# Patient Record
Sex: FEMALE | Race: WHITE | URBAN - METROPOLITAN AREA
[De-identification: names, ages, dates, MRNs, and addresses within clinical notes are randomized per-mention and may not be internally consistent; named-entity substitution may affect disease eponyms.]

---

## 2020-12-10 ENCOUNTER — OFFICE VISIT (OUTPATIENT)
Dept: URBAN - METROPOLITAN AREA CLINIC 98 | Facility: CLINIC | Age: 65
End: 2020-12-10
Payer: MEDICARE

## 2020-12-10 VITALS
HEIGHT: 68 IN | WEIGHT: 141.4 LBS | TEMPERATURE: 96.9 F | BODY MASS INDEX: 21.43 KG/M2 | SYSTOLIC BLOOD PRESSURE: 119 MMHG | HEART RATE: 65 BPM | DIASTOLIC BLOOD PRESSURE: 78 MMHG

## 2020-12-10 DIAGNOSIS — K76.89 LIVER CYST: ICD-10-CM

## 2020-12-10 PROCEDURE — 99214 OFFICE O/P EST MOD 30 MIN: CPT | Performed by: INTERNAL MEDICINE

## 2020-12-10 PROCEDURE — G9903 PT SCRN TBCO ID AS NON USER: HCPCS | Performed by: INTERNAL MEDICINE

## 2020-12-10 PROCEDURE — 3017F COLORECTAL CA SCREEN DOC REV: CPT | Performed by: INTERNAL MEDICINE

## 2020-12-10 PROCEDURE — G8420 CALC BMI NORM PARAMETERS: HCPCS | Performed by: INTERNAL MEDICINE

## 2020-12-10 PROCEDURE — G8482 FLU IMMUNIZE ORDER/ADMIN: HCPCS | Performed by: INTERNAL MEDICINE

## 2020-12-10 PROCEDURE — G8427 DOCREV CUR MEDS BY ELIG CLIN: HCPCS | Performed by: INTERNAL MEDICINE

## 2020-12-10 RX ORDER — IBUPROFEN 200 MG/1
TABLET, COATED ORAL
Qty: 0 | Refills: 0 | Status: ACTIVE | COMMUNITY
Start: 1900-01-01

## 2020-12-10 RX ORDER — CLONAZEPAM 0.5 MG/1
1 TABLET AT BEDTIME TABLET ORAL ONCE A DAY
Status: ACTIVE | COMMUNITY

## 2020-12-10 RX ORDER — CETIRIZINE HYDROCHLORIDE 10 MG/1
TABLET, FILM COATED ORAL
Qty: 0 | Refills: 0 | Status: ACTIVE | COMMUNITY
Start: 1900-01-01

## 2020-12-10 NOTE — HPI-TODAY'S VISIT:
Here on referral from Dr Jacob Choudhury for liver abnormal scan seen incidently on MR breast.  dad alcoholic -  of brain cancer.  pt has no prior knowledge of liver test abnormalities - on her MR: there were scattered cysts in liver. r lobe 2.3 cm, left lobe 1.1cm. no abdominal pain.  no itching.  no nausea / early satiety

## 2020-12-10 NOTE — PHYSICAL EXAM NEUROLOGIC:
oriented to person, place and time ; short and long term memory intact [employer] Preamble Text (Leave Blank If You Do Not Want): Undermining was performed with blunt dissection.

## 2020-12-11 LAB
A/G RATIO: 1.6
ALBUMIN: 4.4
ALKALINE PHOSPHATASE: 66
ALT (SGPT): 18
AST (SGOT): 21
BASO (ABSOLUTE): 0.1
BASOS: 1
BILIRUBIN, TOTAL: 0.3
BUN/CREATININE RATIO: 27
BUN: 20
CALCIUM: 10.2
CARBON DIOXIDE, TOTAL: 23
CHLORIDE: 104
COMMENT:: (no result)
CREATININE: 0.75
EGFR IF AFRICN AM: 96
EGFR IF NONAFRICN AM: 83
EOS (ABSOLUTE): 0.1
EOS: 2
GLOBULIN, TOTAL: 2.7
GLUCOSE: 88
HBSAG SCREEN: NEGATIVE
HCV AB: <0.1
HEMATOCRIT: 39.2
HEMATOLOGY COMMENTS:: (no result)
HEMOGLOBIN: 13.1
HEP A AB, TOTAL: NEGATIVE
HEP B CORE AB, TOT: NEGATIVE
HEPATITIS B SURF AB QUANT: <3.1
IMMATURE CELLS: (no result)
IMMATURE GRANS (ABS): 0
IMMATURE GRANULOCYTES: 0
LYMPHS (ABSOLUTE): 1.2
LYMPHS: 22
MCH: 28.2
MCHC: 33.4
MCV: 85
MONOCYTES(ABSOLUTE): 0.4
MONOCYTES: 8
NEUTROPHILS (ABSOLUTE): 3.6
NEUTROPHILS: 67
NRBC: (no result)
PLATELETS: 284
POTASSIUM: 4.4
PROTEIN, TOTAL: 7.1
RBC: 4.64
RDW: 12
SODIUM: 137
WBC: 5.3

## 2021-09-15 ENCOUNTER — TELEPHONE ENCOUNTER (OUTPATIENT)
Dept: URBAN - METROPOLITAN AREA CLINIC 98 | Facility: CLINIC | Age: 66
End: 2021-09-15

## 2021-10-12 ENCOUNTER — LAB OUTSIDE AN ENCOUNTER (OUTPATIENT)
Dept: URBAN - METROPOLITAN AREA CLINIC 98 | Facility: CLINIC | Age: 66
End: 2021-10-12

## 2021-10-14 ENCOUNTER — TELEPHONE ENCOUNTER (OUTPATIENT)
Dept: URBAN - METROPOLITAN AREA CLINIC 98 | Facility: CLINIC | Age: 66
End: 2021-10-14

## 2021-10-15 LAB
A/G RATIO: 2.1
ALBUMIN: 4.8
ALKALINE PHOSPHATASE: 70
ALT (SGPT): 17
AST (SGOT): 19
BASO (ABSOLUTE): 0.1
BASOS: 1
BILIRUBIN, TOTAL: 0.3
BUN/CREATININE RATIO: 27
BUN: 18
CALCIUM: 9.4
CARBON DIOXIDE, TOTAL: 24
CHLORIDE: 102
CREATININE: 0.66
EGFR IF AFRICN AM: 106
EGFR IF NONAFRICN AM: 92
EOS (ABSOLUTE): 0.1
EOS: 1
GGT: 13
GLOBULIN, TOTAL: 2.3
GLUCOSE: 91
HEMATOCRIT: 40.2
HEMATOLOGY COMMENTS:: (no result)
HEMOGLOBIN: 13.2
IMMATURE CELLS: (no result)
IMMATURE GRANS (ABS): 0
IMMATURE GRANULOCYTES: 0
LYMPHS (ABSOLUTE): 1.4
LYMPHS: 20
MCH: 27.7
MCHC: 32.8
MCV: 84
MONOCYTES(ABSOLUTE): 0.6
MONOCYTES: 8
NEUTROPHILS (ABSOLUTE): 5
NEUTROPHILS: 70
NRBC: (no result)
PLATELETS: 337
POTASSIUM: 4
PROTEIN, TOTAL: 7.1
RBC: 4.77
RDW: 11.5
SODIUM: 140
WBC: 7.2

## 2021-10-18 ENCOUNTER — OFFICE VISIT (OUTPATIENT)
Dept: URBAN - METROPOLITAN AREA CLINIC 98 | Facility: CLINIC | Age: 66
End: 2021-10-18
Payer: MEDICARE

## 2021-10-18 ENCOUNTER — WEB ENCOUNTER (OUTPATIENT)
Dept: URBAN - METROPOLITAN AREA CLINIC 98 | Facility: CLINIC | Age: 66
End: 2021-10-18

## 2021-10-18 VITALS
BODY MASS INDEX: 21.16 KG/M2 | HEART RATE: 64 BPM | TEMPERATURE: 97.3 F | SYSTOLIC BLOOD PRESSURE: 109 MMHG | DIASTOLIC BLOOD PRESSURE: 69 MMHG | WEIGHT: 139.6 LBS | HEIGHT: 68 IN

## 2021-10-18 DIAGNOSIS — K83.8 DILATED BILE DUCT: ICD-10-CM

## 2021-10-18 DIAGNOSIS — K76.89 LIVER CYST: ICD-10-CM

## 2021-10-18 PROCEDURE — 99214 OFFICE O/P EST MOD 30 MIN: CPT | Performed by: INTERNAL MEDICINE

## 2021-10-18 RX ORDER — CETIRIZINE HYDROCHLORIDE 10 MG/1
TABLET, FILM COATED ORAL
Qty: 0 | Refills: 0 | Status: ACTIVE | COMMUNITY
Start: 1900-01-01

## 2021-10-18 RX ORDER — CLONAZEPAM 0.5 MG/1
1 TABLET AT BEDTIME TABLET ORAL ONCE A DAY
Status: ACTIVE | COMMUNITY

## 2021-10-18 RX ORDER — IBUPROFEN 200 MG/1
TABLET, COATED ORAL
Qty: 0 | Refills: 0 | Status: ACTIVE | COMMUNITY
Start: 1900-01-01

## 2021-10-18 NOTE — HPI-TODAY'S VISIT:
HEre to follow up after RUQ US . c/o stomach bloating. having some chronic back issues.   mild heartburn - but she attributes to personal stress.  flares up when she is emotional.  had stopped her probiotic.   working on implants.  . seen last year for incidental finding of hepatic cyst.  she did not get AJAY US as ordered last year but then MRI of back to follow up for neck fusion showed dilated CBD  US 10/2021 showed :  Enlargement of R hepatic lobe cyst w mural nodularity .  prev 1.8cm 2014, now 2.7cm  CBD dilation 1cm w/o obvious obstructing lesion . stress going through a divorce with  who suffers from alcoholism

## 2021-11-01 ENCOUNTER — LAB OUTSIDE AN ENCOUNTER (OUTPATIENT)
Dept: URBAN - METROPOLITAN AREA CLINIC 98 | Facility: CLINIC | Age: 66
End: 2021-11-01

## 2021-11-04 ENCOUNTER — TELEPHONE ENCOUNTER (OUTPATIENT)
Dept: URBAN - METROPOLITAN AREA CLINIC 98 | Facility: CLINIC | Age: 66
End: 2021-11-04

## 2021-11-05 ENCOUNTER — WEB ENCOUNTER (OUTPATIENT)
Dept: URBAN - METROPOLITAN AREA CLINIC 86 | Facility: CLINIC | Age: 66
End: 2021-11-05

## 2023-01-27 ENCOUNTER — LAB OUTSIDE AN ENCOUNTER (OUTPATIENT)
Dept: URBAN - METROPOLITAN AREA CLINIC 98 | Facility: CLINIC | Age: 68
End: 2023-01-27

## 2023-01-27 ENCOUNTER — TELEPHONE ENCOUNTER (OUTPATIENT)
Dept: URBAN - METROPOLITAN AREA CLINIC 98 | Facility: CLINIC | Age: 68
End: 2023-01-27

## 2023-01-27 ENCOUNTER — WEB ENCOUNTER (OUTPATIENT)
Dept: URBAN - METROPOLITAN AREA CLINIC 86 | Facility: CLINIC | Age: 68
End: 2023-01-27

## 2023-02-01 ENCOUNTER — LAB OUTSIDE AN ENCOUNTER (OUTPATIENT)
Dept: URBAN - METROPOLITAN AREA CLINIC 96 | Facility: CLINIC | Age: 68
End: 2023-02-01

## 2023-02-01 ENCOUNTER — OFFICE VISIT (OUTPATIENT)
Dept: URBAN - METROPOLITAN AREA CLINIC 96 | Facility: CLINIC | Age: 68
End: 2023-02-01
Payer: MEDICARE

## 2023-02-01 VITALS
DIASTOLIC BLOOD PRESSURE: 81 MMHG | BODY MASS INDEX: 20.16 KG/M2 | WEIGHT: 133 LBS | HEART RATE: 69 BPM | HEIGHT: 68 IN | SYSTOLIC BLOOD PRESSURE: 151 MMHG | TEMPERATURE: 98 F

## 2023-02-01 DIAGNOSIS — R19.7 DIARRHEA, UNSPECIFIED TYPE: ICD-10-CM

## 2023-02-01 DIAGNOSIS — R10.13 EPIGASTRIC PAIN: ICD-10-CM

## 2023-02-01 DIAGNOSIS — R11.0 NAUSEA: ICD-10-CM

## 2023-02-01 DIAGNOSIS — K58.0 IRRITABLE BOWEL SYNDROME WITH DIARRHEA: ICD-10-CM

## 2023-02-01 DIAGNOSIS — R14.0 ABDOMINAL BLOATING: ICD-10-CM

## 2023-02-01 PROBLEM — 197125005: Status: ACTIVE | Noted: 2023-02-01

## 2023-02-01 PROCEDURE — 99204 OFFICE O/P NEW MOD 45 MIN: CPT | Performed by: INTERNAL MEDICINE

## 2023-02-01 RX ORDER — IBUPROFEN 200 MG/1
TABLET, COATED ORAL
Qty: 0 | Refills: 0 | COMMUNITY
Start: 1900-01-01

## 2023-02-01 RX ORDER — CLONAZEPAM 0.5 MG/1
1 TABLET AT BEDTIME TABLET ORAL ONCE A DAY
COMMUNITY

## 2023-02-01 RX ORDER — CETIRIZINE HYDROCHLORIDE 10 MG/1
TABLET, FILM COATED ORAL
Qty: 0 | Refills: 0 | COMMUNITY
Start: 1900-01-01

## 2023-02-01 NOTE — HPI-TODAY'S VISIT:
This is a 67-year-old female referred for GI consultation and a copy will be sent to the referring provider Dr. ANDRE Choudhury.  Patient has been seeing my partner Dr. Valdez for some liver issues and has had studies including an MRI for this in 10/2021.  I met her back in 2018 at which time she saw me for IBS symptoms.  She had diarrhea predominant IBS for 2 decades at the time.  Unfortunately her sister had  of stomach cancer at the time so she had more stress in her life which was understandable.  She also complained of some burning and nausea at that time.  I ordered a lactulose breath test to rule out lactose intolerance and an upper endoscopy and Xifaxan for her IBS.  EGD was done in 2018.  This revealed no gross abnormalities.  Biopsies were sent.  Pathology revealed no evidence of celiac, reactive gastropathy of the antrum but no H. pylori and gastric body biopsies were normal. Pt says over the last year she feels more bloated and has some lower abd discomfort. Pt goes everyday but not having attacks of her IBS. Pt also has lots of growling and its very load. Pt does have some epigastric burning and some nausea after eating. Pt was nervous as her siter  of stomach cancer at this age.

## 2023-02-02 PROBLEM — 422587007 NAUSEA: Status: ACTIVE | Noted: 2023-02-02

## 2023-02-02 PROBLEM — 79922009 EPIGASTRIC PAIN: Status: ACTIVE | Noted: 2023-02-02

## 2023-02-06 ENCOUNTER — LAB OUTSIDE AN ENCOUNTER (OUTPATIENT)
Dept: URBAN - METROPOLITAN AREA CLINIC 96 | Facility: CLINIC | Age: 68
End: 2023-02-06

## 2023-02-09 ENCOUNTER — TELEPHONE ENCOUNTER (OUTPATIENT)
Dept: URBAN - METROPOLITAN AREA CLINIC 3 | Facility: CLINIC | Age: 68
End: 2023-02-09

## 2023-02-11 LAB
ADENOVIRUS F 40/41: NOT DETECTED
CAMPYLOBACTER: NOT DETECTED
CLOSTRIDIUM DIFFICILE: NOT DETECTED
CRYPTOSPORIDIUM: NOT DETECTED
CYCLOSPORA CAYETANESIS: NOT DETECTED
ENTAMOEBA HISTOLYTICA: NOT DETECTED
ENTAMOEBA HISTOLYTICA: NOT DETECTED
ENTEROAGGREGATIVE E.COLI: NOT DETECTED
ENTEROTOXIGENIC E.COLI: NOT DETECTED
ESCHERICHIA COLI O157: NOT DETECTED
GIARDIA LAMBIA: NOT DETECTED
NOROVIRUS GI/GII: NOT DETECTED
NOROVIRUS GI/GII: NOT DETECTED
ROTAVIRUS A: NOT DETECTED
SHIGA-LIKE TOXIN PRODUCING E.COLI: NOT DETECTED
SHIGELLA SPP. / ENTEROINVASIVE E.COLI: NOT DETECTED
VIBRIO CHOLERAE: NOT DETECTED
VIBRIO PARAHAEMOLYTICUS: NOT DETECTED
VIBRIO SPP.: NOT DETECTED
YERSINIA ENTEROCOLITICA: NOT DETECTED
YERSINIA ENTEROCOLITICA: NOT DETECTED

## 2023-02-22 ENCOUNTER — LAB OUTSIDE AN ENCOUNTER (OUTPATIENT)
Dept: URBAN - METROPOLITAN AREA CLINIC 98 | Facility: CLINIC | Age: 68
End: 2023-02-22

## 2023-02-22 LAB
A/G RATIO: 1.9
ALBUMIN: 4.5
ALKALINE PHOSPHATASE: 56
ALT (SGPT): 21
AST (SGOT): 18
BASO (ABSOLUTE): 0.1
BASOS: 1
BILIRUBIN, DIRECT: <0.1
BILIRUBIN, TOTAL: 0.3
BUN/CREATININE RATIO: 26
BUN: 17
CALCIUM: 9.3
CARBON DIOXIDE, TOTAL: 26
CHLORIDE: 105
CREATININE: 0.66
EGFR: 95
EOS (ABSOLUTE): 0.1
EOS: 2
GGT: 11
GLOBULIN, TOTAL: 2.4
GLUCOSE: 99
HEMATOCRIT: 40.6
HEMATOLOGY COMMENTS:: (no result)
HEMOGLOBIN: 13.1
IMMATURE CELLS: (no result)
IMMATURE GRANS (ABS): 0
IMMATURE GRANULOCYTES: 0
LYMPHS (ABSOLUTE): 1.4
LYMPHS: 34
MCH: 27.9
MCHC: 32.3
MCV: 86
MONOCYTES(ABSOLUTE): 0.4
MONOCYTES: 8
NEUTROPHILS (ABSOLUTE): 2.2
NEUTROPHILS: 55
NRBC: (no result)
PLATELETS: 270
POTASSIUM: 4.2
PROTEIN, TOTAL: 6.9
RBC: 4.7
RDW: 11.3
SODIUM: 144
WBC: 4.2

## 2023-02-24 ENCOUNTER — OFFICE VISIT (OUTPATIENT)
Dept: URBAN - METROPOLITAN AREA CLINIC 98 | Facility: CLINIC | Age: 68
End: 2023-02-24
Payer: MEDICARE

## 2023-02-24 VITALS
TEMPERATURE: 97.6 F | HEART RATE: 66 BPM | DIASTOLIC BLOOD PRESSURE: 65 MMHG | SYSTOLIC BLOOD PRESSURE: 135 MMHG | BODY MASS INDEX: 21.82 KG/M2 | WEIGHT: 139 LBS | HEIGHT: 67 IN

## 2023-02-24 DIAGNOSIS — K76.89 LIVER CYST: ICD-10-CM

## 2023-02-24 DIAGNOSIS — K83.8 DILATED BILE DUCT: ICD-10-CM

## 2023-02-24 DIAGNOSIS — N28.1 RENAL CYST: ICD-10-CM

## 2023-02-24 DIAGNOSIS — K86.2 PANCREATIC CYST: ICD-10-CM

## 2023-02-24 PROCEDURE — 99214 OFFICE O/P EST MOD 30 MIN: CPT | Performed by: INTERNAL MEDICINE

## 2023-02-24 RX ORDER — IBUPROFEN 200 MG/1
TABLET, COATED ORAL
Qty: 0 | Refills: 0 | Status: ACTIVE | COMMUNITY
Start: 1900-01-01

## 2023-02-24 RX ORDER — CLONAZEPAM 0.5 MG/1
1 TABLET AT BEDTIME TABLET ORAL ONCE A DAY
Status: ACTIVE | COMMUNITY

## 2023-02-24 RX ORDER — CETIRIZINE HYDROCHLORIDE 10 MG/1
TABLET, FILM COATED ORAL
Qty: 0 | Refills: 0 | Status: ACTIVE | COMMUNITY
Start: 1900-01-01

## 2023-02-24 NOTE — HPI-TODAY'S VISIT:
Here to follow for liver cysts.  has had some more bloating :planned to have EGD with Dr Dumont soon.   No difficulty eating

## 2023-02-24 NOTE — HPI-OTHER HISTORIES
REPORT MRI ABDOMEN WITHOUT AND WITH CONTRAST  CLINICAL HISTORY: Hepatic cysts and pancreatic cyst.  TECHNIQUE: MRI of the abdomen with attention to the liver and pancreas was performed using a variety of multiplanar pulse sequences before and after administration of 6 mL of Gadavist gadolinium contrast.  COMPARISON: 11/01/2021  FINDINGS: Scattered simple and mildly complex hepatic cysts are again present, largest of which is best seen on series 23 image 23, measuring 2.5 x 1.9 cm, unchanged. This largest lesion contains small septations. Scattered smaller cysts are scattered throughout the liver are also grossly unchanged. Common bile duct unchanged in caliber but again prominent, at 9 mm, with no definite filling defects. Gallbladder unremarkable. No significant intrahepatic biliary ductal dilation. Main pancreatic duct is normal in caliber at 1 - 2 mm. There are two tiny cystic lesions within the pancreas, unchanged from prior and best seen on series 3 images 8 and 9. The larger of these measures 3 mm in diameter and in retrospect measured at a similar level on the prior is unchanged.  There are a few scattered tiny renal cysts, largest of which arises from the left lower pole measuring 2.2 cm in diameter. These are unchanged. No upper abdominal lymphadenopathy. No ascites or significant pleural fluid.  IMPRESSION: 1.  Unchanged size of simple and mildly complex hepatic cyst.  2.  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 An additional surveillance MRI in two years is recommended.  3.  Stable prominence in caliber of the common bile duct without filling defects.  4.  Unchanged left renal cysts.  LOCATION:     Signature Line ***** Final *****  Dictated by:    Carmela Sawyer MD Dictated DT/TM: 02/23/2023 9:18 am Signed by:  Carmela Sawyer MD Signed (Electronic Signature):  02/23/2023 4:23 pm

## 2023-02-25 PROBLEM — 123608004: Status: ACTIVE | Noted: 2021-10-18

## 2023-02-25 PROBLEM — 85057007 LIVER CYST: Status: ACTIVE | Noted: 2020-12-10

## 2023-02-26 ENCOUNTER — WEB ENCOUNTER (OUTPATIENT)
Dept: URBAN - METROPOLITAN AREA CLINIC 96 | Facility: CLINIC | Age: 68
End: 2023-02-26

## 2023-03-07 ENCOUNTER — WEB ENCOUNTER (OUTPATIENT)
Dept: URBAN - METROPOLITAN AREA SURGERY CENTER 18 | Facility: SURGERY CENTER | Age: 68
End: 2023-03-07

## 2023-03-10 ENCOUNTER — OFFICE VISIT (OUTPATIENT)
Dept: URBAN - METROPOLITAN AREA SURGERY CENTER 18 | Facility: SURGERY CENTER | Age: 68
End: 2023-03-10

## 2023-06-09 ENCOUNTER — TELEPHONE ENCOUNTER (OUTPATIENT)
Dept: URBAN - METROPOLITAN AREA CLINIC 3 | Facility: CLINIC | Age: 68
End: 2023-06-09

## 2023-08-04 ENCOUNTER — DASHBOARD ENCOUNTERS (OUTPATIENT)
Age: 68
End: 2023-08-04

## 2023-08-09 ENCOUNTER — CLAIMS CREATED FROM THE CLAIM WINDOW (OUTPATIENT)
Dept: URBAN - METROPOLITAN AREA CLINIC 4 | Facility: CLINIC | Age: 68
End: 2023-08-09
Payer: MEDICARE

## 2023-08-09 ENCOUNTER — OFFICE VISIT (OUTPATIENT)
Dept: URBAN - METROPOLITAN AREA SURGERY CENTER 18 | Facility: SURGERY CENTER | Age: 68
End: 2023-08-09
Payer: MEDICARE

## 2023-08-09 VITALS — HEIGHT: 67 IN | BODY MASS INDEX: 21.82 KG/M2 | TEMPERATURE: 97.7 F | WEIGHT: 139 LBS

## 2023-08-09 DIAGNOSIS — R10.13 ABDOMINAL DISCOMFORT, EPIGASTRIC: ICD-10-CM

## 2023-08-09 DIAGNOSIS — K21.9 ACID REFLUX: ICD-10-CM

## 2023-08-09 DIAGNOSIS — K29.70 GASTRITIS, UNSPECIFIED, WITHOUT BLEEDING: ICD-10-CM

## 2023-08-09 DIAGNOSIS — K31.89 OTHER DISEASES OF STOMACH AND DUODENUM: ICD-10-CM

## 2023-08-09 PROCEDURE — 88305 TISSUE EXAM BY PATHOLOGIST: CPT | Performed by: PATHOLOGY

## 2023-08-09 PROCEDURE — G8907 PT DOC NO EVENTS ON DISCHARG: HCPCS | Performed by: INTERNAL MEDICINE

## 2023-08-09 PROCEDURE — 88312 SPECIAL STAINS GROUP 1: CPT | Performed by: PATHOLOGY

## 2023-08-09 PROCEDURE — 43239 EGD BIOPSY SINGLE/MULTIPLE: CPT | Performed by: INTERNAL MEDICINE

## 2023-08-09 RX ORDER — IBUPROFEN 200 MG/1
TABLET, COATED ORAL
Qty: 0 | Refills: 0 | Status: ON HOLD | COMMUNITY
Start: 1900-01-01

## 2023-08-09 RX ORDER — CLONAZEPAM 0.5 MG/1
1 TABLET AT BEDTIME TABLET ORAL ONCE A DAY
Status: ACTIVE | COMMUNITY

## 2023-08-09 RX ORDER — CLONAZEPAM 1 MG/1
TABLET ORAL
Qty: 45 TABLET | Status: ACTIVE | COMMUNITY

## 2023-08-09 RX ORDER — CETIRIZINE HYDROCHLORIDE 10 MG/1
TABLET, FILM COATED ORAL
Qty: 0 | Refills: 0 | Status: ACTIVE | COMMUNITY
Start: 1900-01-01

## 2023-08-09 RX ORDER — DICLOFENAC SODIUM 75 MG/1
TABLET, DELAYED RELEASE ORAL
Qty: 10 TABLET | Status: ON HOLD | COMMUNITY

## 2024-09-30 ENCOUNTER — OFFICE VISIT (OUTPATIENT)
Dept: URBAN - METROPOLITAN AREA CLINIC 96 | Facility: CLINIC | Age: 69
End: 2024-09-30

## 2024-09-30 ENCOUNTER — LAB OUTSIDE AN ENCOUNTER (OUTPATIENT)
Dept: URBAN - METROPOLITAN AREA CLINIC 96 | Facility: CLINIC | Age: 69
End: 2024-09-30

## 2024-09-30 VITALS
BODY MASS INDEX: 21.5 KG/M2 | WEIGHT: 137 LBS | SYSTOLIC BLOOD PRESSURE: 126 MMHG | TEMPERATURE: 97.5 F | HEIGHT: 67 IN | DIASTOLIC BLOOD PRESSURE: 63 MMHG | HEART RATE: 63 BPM

## 2024-09-30 RX ORDER — CLONAZEPAM 0.5 MG/1
1 TABLET AT BEDTIME TABLET ORAL ONCE A DAY
Status: ACTIVE | COMMUNITY

## 2024-09-30 RX ORDER — CETIRIZINE HYDROCHLORIDE 10 MG/1
TABLET, FILM COATED ORAL
Qty: 0 | Refills: 0 | Status: ACTIVE | COMMUNITY
Start: 1900-01-01

## 2024-09-30 RX ORDER — CLONAZEPAM 1 MG/1
TABLET ORAL
Qty: 45 TABLET | Status: ACTIVE | COMMUNITY

## 2024-09-30 RX ORDER — DICLOFENAC SODIUM 75 MG/1
TABLET, DELAYED RELEASE ORAL
Qty: 10 TABLET | COMMUNITY

## 2024-09-30 NOTE — HPI-OTHER HISTORIES
Previously 2023 with Dr. Dumont: This is a 67-year-old female referred for GI consultation and a copy will be sent to the referring provider Dr. ANDRE Choudhury. Patient has been seeing my partner Dr. Valdez for some liver issues and has had studies including an MRI for this in 10/2021. I met her back in 2018 at which time she saw me for IBS symptoms. She had diarrhea predominant IBS for 2 decades at the time. Unfortunately her sister had  of stomach cancer at the time so she had more stress in her life which was understandable. She also complained of some burning and nausea at that time. I ordered a lactulose breath test to rule out lactose intolerance and an upper endoscopy and Xifaxan for her IBS. EGD was done in 2018. This revealed no gross abnormalities. Biopsies were sent. Pathology revealed no evidence of celiac, reactive gastropathy of the antrum but no H. pylori and gastric body biopsies were normal. Pt says over the last year she feels more bloated and has some lower abd discomfort. Pt goes everyday but not having attacks of her IBS. Pt also has lots of growling and its very load. Pt does have some epigastric burning and some nausea after eating. Pt was nervous as her siter  of stomach cancer at this age.

## 2024-09-30 NOTE — HPI-TODAY'S VISIT:
69 year old female presents for colonoscopy screening. . BMs every day.  Intermittent straining and constipated a few times a month. Denies blood in stool, melena, rectal pain, abdominal pain, unexplained weight loss. Complains of increased flatulence and abdominal bloating.  Has not tried changing her diet. . Got a notice in the mail that she is due for her colonoscopy.  Can't remember year of last colonoscopy.  Dont' remember if they found anything. Denies FHx of colon cancer, colon polyps, UC, and CD. . EGD, 8/2023, Dr. Dumont: Normal esophagus.  Normal gastric antrum.  Normal gastric body.  Normal duodenum.  Duodenal biopsy unremarkable.  Antrum biopsy with chemical/reactive gastropathy, no evidence of H. pylori or intestinal metaplasia.  Body biopsy unremarkable.  Lower esophageal biopsy with reflux type changes, no evidence of Lipscomb's esophagus or eosinophilic esophagitis.

## 2024-12-11 ENCOUNTER — TELEPHONE ENCOUNTER (OUTPATIENT)
Dept: URBAN - METROPOLITAN AREA CLINIC 96 | Facility: CLINIC | Age: 69
End: 2024-12-11

## 2024-12-18 ENCOUNTER — CLAIMS CREATED FROM THE CLAIM WINDOW (OUTPATIENT)
Dept: URBAN - METROPOLITAN AREA CLINIC 4 | Facility: CLINIC | Age: 69
End: 2024-12-18
Payer: MEDICARE

## 2024-12-18 ENCOUNTER — OFFICE VISIT (OUTPATIENT)
Dept: URBAN - METROPOLITAN AREA SURGERY CENTER 18 | Facility: SURGERY CENTER | Age: 69
End: 2024-12-18
Payer: MEDICARE

## 2024-12-18 DIAGNOSIS — Z12.11 ENCOUNTER FOR SCREENING FOR MALIGNANT NEOPLASM OF COLON: ICD-10-CM

## 2024-12-18 DIAGNOSIS — K57.30 DIVERTICULOSIS OF SIGMOID COLON: ICD-10-CM

## 2024-12-18 DIAGNOSIS — D12.5 ADENOMATOUS POLYP OF SIGMOID COLON: ICD-10-CM

## 2024-12-18 DIAGNOSIS — Z12.11 COLON CANCER SCREENING (HIGH RISK): ICD-10-CM

## 2024-12-18 DIAGNOSIS — K63.5 POLYP OF COLON: ICD-10-CM

## 2024-12-18 DIAGNOSIS — Z12.11 COLON CANCER SCREENING: ICD-10-CM

## 2024-12-18 DIAGNOSIS — K63.5 POLYP OF COLON, UNSPECIFIED PART OF COLON, UNSPECIFIED TYPE: ICD-10-CM

## 2024-12-18 PROCEDURE — 00811 ANES LWR INTST NDSC NOS: CPT | Performed by: NURSE ANESTHETIST, CERTIFIED REGISTERED

## 2024-12-18 PROCEDURE — 45380 COLONOSCOPY AND BIOPSY: CPT | Performed by: INTERNAL MEDICINE

## 2024-12-18 PROCEDURE — 88305 TISSUE EXAM BY PATHOLOGIST: CPT | Performed by: PATHOLOGY

## 2024-12-18 RX ORDER — CETIRIZINE HYDROCHLORIDE 10 MG/1
TABLET, FILM COATED ORAL
Qty: 0 | Refills: 0 | Status: ACTIVE | COMMUNITY
Start: 1900-01-01

## 2024-12-18 RX ORDER — DICLOFENAC SODIUM 75 MG/1
TABLET, DELAYED RELEASE ORAL
Qty: 10 TABLET | COMMUNITY

## 2024-12-18 RX ORDER — CLONAZEPAM 1 MG/1
TABLET ORAL
Qty: 45 TABLET | Status: ACTIVE | COMMUNITY

## 2024-12-18 RX ORDER — CLONAZEPAM 0.5 MG/1
1 TABLET AT BEDTIME TABLET ORAL ONCE A DAY
Status: ACTIVE | COMMUNITY

## 2025-03-01 ENCOUNTER — WEB ENCOUNTER (OUTPATIENT)
Dept: URBAN - METROPOLITAN AREA CLINIC 96 | Facility: CLINIC | Age: 70
End: 2025-03-01

## 2025-03-03 ENCOUNTER — TELEPHONE ENCOUNTER (OUTPATIENT)
Dept: URBAN - METROPOLITAN AREA CLINIC 96 | Facility: CLINIC | Age: 70
End: 2025-03-03

## 2025-03-03 ENCOUNTER — WEB ENCOUNTER (OUTPATIENT)
Dept: URBAN - METROPOLITAN AREA CLINIC 96 | Facility: CLINIC | Age: 70
End: 2025-03-03

## 2025-03-04 ENCOUNTER — TELEPHONE ENCOUNTER (OUTPATIENT)
Dept: URBAN - METROPOLITAN AREA CLINIC 96 | Facility: CLINIC | Age: 70
End: 2025-03-04